# Patient Record
Sex: FEMALE | Race: OTHER | Employment: UNEMPLOYED | URBAN - METROPOLITAN AREA
[De-identification: names, ages, dates, MRNs, and addresses within clinical notes are randomized per-mention and may not be internally consistent; named-entity substitution may affect disease eponyms.]

---

## 2024-03-17 ENCOUNTER — HOSPITAL ENCOUNTER (EMERGENCY)
Facility: HOSPITAL | Age: 39
Discharge: HOME | End: 2024-03-17
Attending: STUDENT IN AN ORGANIZED HEALTH CARE EDUCATION/TRAINING PROGRAM

## 2024-03-17 VITALS
DIASTOLIC BLOOD PRESSURE: 72 MMHG | SYSTOLIC BLOOD PRESSURE: 105 MMHG | TEMPERATURE: 98.3 F | HEART RATE: 90 BPM | RESPIRATION RATE: 16 BRPM | WEIGHT: 170.19 LBS | OXYGEN SATURATION: 98 %

## 2024-03-17 DIAGNOSIS — H10.9 CONJUNCTIVITIS OF RIGHT EYE, UNSPECIFIED CONJUNCTIVITIS TYPE: Primary | ICD-10-CM

## 2024-03-17 DIAGNOSIS — J30.2 SEASONAL ALLERGIC RHINITIS, UNSPECIFIED TRIGGER: ICD-10-CM

## 2024-03-17 PROCEDURE — 6370000000 HC RX 637 (ALT 250 FOR IP): Performed by: EMERGENCY MEDICINE

## 2024-03-17 PROCEDURE — 99283 EMERGENCY DEPT VISIT LOW MDM: CPT

## 2024-03-17 RX ORDER — CETIRIZINE HYDROCHLORIDE 10 MG/1
10 TABLET ORAL
Status: COMPLETED | OUTPATIENT
Start: 2024-03-17 | End: 2024-03-17

## 2024-03-17 RX ORDER — CETIRIZINE HYDROCHLORIDE 10 MG/1
10 TABLET ORAL DAILY
Qty: 30 TABLET | Refills: 0 | Status: SHIPPED | OUTPATIENT
Start: 2024-03-17

## 2024-03-17 RX ORDER — GENTAMICIN SULFATE 3 MG/ML
2 SOLUTION/ DROPS OPHTHALMIC
Status: COMPLETED | OUTPATIENT
Start: 2024-03-17 | End: 2024-03-17

## 2024-03-17 RX ORDER — GENTAMICIN SULFATE 3 MG/ML
1 SOLUTION/ DROPS OPHTHALMIC EVERY 4 HOURS
Qty: 5 ML | Refills: 0 | Status: SHIPPED | OUTPATIENT
Start: 2024-03-17 | End: 2024-03-22

## 2024-03-17 RX ORDER — TETRACAINE HYDROCHLORIDE 5 MG/ML
1 SOLUTION OPHTHALMIC
Status: COMPLETED | OUTPATIENT
Start: 2024-03-17 | End: 2024-03-17

## 2024-03-17 RX ADMIN — FLUORESCEIN SODIUM 1 MG: 1 STRIP OPHTHALMIC at 12:36

## 2024-03-17 RX ADMIN — TETRACAINE HYDROCHLORIDE 1 DROP: 5 SOLUTION OPHTHALMIC at 12:36

## 2024-03-17 RX ADMIN — GENTAMICIN SULFATE 2 DROP: 3 SOLUTION OPHTHALMIC at 13:10

## 2024-03-17 RX ADMIN — CETIRIZINE HYDROCHLORIDE 10 MG: 10 TABLET, FILM COATED ORAL at 12:38

## 2024-03-17 ASSESSMENT — PAIN SCALES - GENERAL: PAINLEVEL_OUTOF10: 0

## 2024-03-17 NOTE — ED PROVIDER NOTES
HISTORY   No past medical history on file.      SURGICAL HISTORY     No past surgical history on file.      CURRENT MEDICATIONS       Previous Medications    No medications on file       ALLERGIES     Patient has no known allergies.    FAMILY HISTORY     No family history on file.       SOCIAL HISTORY              PHYSICAL EXAM    (up to 7 for level 4, 8 or more for level 5)     ED Triage Vitals [03/17/24 1227]   BP Temp Temp Source Pulse Respirations SpO2 Height Weight - Scale   105/72 98.3 °F (36.8 °C) Oral 90 16 98 % -- 77.2 kg (170 lb 3.1 oz)       There is no height or weight on file to calculate BMI.    Physical Exam  Vitals and nursing note reviewed.   Constitutional:       General: She is not in acute distress.     Appearance: Normal appearance. She is not ill-appearing, toxic-appearing or diaphoretic.      Comments:  female, non-smoker, presently unemployed   HENT:      Head: Normocephalic.      Right Ear: Tympanic membrane normal.      Left Ear: Tympanic membrane normal.      Nose: Nose normal.      Mouth/Throat:      Mouth: Mucous membranes are moist.      Pharynx: No posterior oropharyngeal erythema.   Eyes:      General:         Right eye: Discharge present.      Extraocular Movements: Extraocular movements intact.      Pupils: Pupils are equal, round, and reactive to light.      Comments: Visual acuity within normal limits. Mild right conjunctival injection; without conjunctival foreign body. Cornea clear; without fluorescein uptake; without foreign body. No foreign body noted with eyelid eversion.     Cardiovascular:      Rate and Rhythm: Normal rate and regular rhythm.   Pulmonary:      Effort: Pulmonary effort is normal.      Breath sounds: Normal breath sounds.   Musculoskeletal:      Cervical back: Normal range of motion and neck supple.   Lymphadenopathy:      Cervical: No cervical adenopathy.   Skin:     General: Skin is warm and dry.      Findings: No rash.   Neurological:      General:

## 2024-03-17 NOTE — ED TRIAGE NOTES
Triage: Pt arrives ambulatory from home with CC of right eye pain, itching and blurred vision. Endorses eye being encrusted when waking in the morning. Denies corrective eyewear or direct injury to the eye.

## 2024-08-06 ENCOUNTER — TELEPHONE (OUTPATIENT)
Age: 39
End: 2024-08-06

## 2024-08-06 NOTE — TELEPHONE ENCOUNTER
The  services used. Called pt about referral from [facility] for dx breast imaging. Called x 1 but no answer, left vm.LFAKO CAPELLAN RN

## 2024-08-13 NOTE — TELEPHONE ENCOUNTER
Called patient ID [id number]. Patient is interested in being enrolled in [facility] program. Completed eligibility and meets qualifications for program. Offered an appt. For a consult on 8/15/24 pt kindly declined and an appt. For 9/16/2024 at [facility] provided. Pt aware that our provider will confirm abnormal finding and if imaging is needed a new appt. Will be coordinated for the breast imaging. This has been fully explained to the patient, who indicates understanding and agrees with plan. No further questions at this time. Garima Khan RN

## 2024-09-12 ENCOUNTER — TELEPHONE (OUTPATIENT)
Age: 39
End: 2024-09-12

## 2024-09-16 ENCOUNTER — INITIAL CONSULT (OUTPATIENT)
Age: 39
End: 2024-09-16

## 2024-09-16 DIAGNOSIS — N63.0 BREAST MASS IN FEMALE: Primary | ICD-10-CM

## 2024-10-22 ENCOUNTER — HOSPITAL ENCOUNTER (OUTPATIENT)
Facility: HOSPITAL | Age: 39
Discharge: HOME | End: 2024-10-25

## 2024-10-22 VITALS — HEIGHT: 62 IN | WEIGHT: 170 LBS | BODY MASS INDEX: 31.28 KG/M2

## 2024-10-22 DIAGNOSIS — N63.0 BREAST MASS IN FEMALE: ICD-10-CM

## 2024-10-22 PROCEDURE — 76642 ULTRASOUND BREAST LIMITED: CPT

## 2024-10-22 PROCEDURE — G0279 TOMOSYNTHESIS, MAMMO: HCPCS

## 2024-11-05 ENCOUNTER — HOSPITAL ENCOUNTER (OUTPATIENT)
Facility: HOSPITAL | Age: 39
Discharge: HOME | End: 2024-11-08

## 2024-11-05 DIAGNOSIS — N63.0 BREAST MASS IN FEMALE: ICD-10-CM

## 2024-11-05 PROCEDURE — 88305 TISSUE EXAM BY PATHOLOGIST: CPT

## 2024-11-05 PROCEDURE — 19083 BX BREAST 1ST LESION US IMAG: CPT

## 2024-11-05 PROCEDURE — 2500000003 HC RX 250 WO HCPCS: Performed by: RADIOLOGY

## 2024-11-05 RX ORDER — LIDOCAINE HYDROCHLORIDE AND EPINEPHRINE 10; 10 MG/ML; UG/ML
10 INJECTION, SOLUTION INFILTRATION; PERINEURAL ONCE
Status: COMPLETED | OUTPATIENT
Start: 2024-11-05 | End: 2024-11-05

## 2024-11-05 RX ORDER — LIDOCAINE HYDROCHLORIDE 10 MG/ML
5 INJECTION, SOLUTION INFILTRATION; PERINEURAL ONCE
Status: COMPLETED | OUTPATIENT
Start: 2024-11-05 | End: 2024-11-05

## 2024-11-05 RX ADMIN — LIDOCAINE HYDROCHLORIDE,EPINEPHRINE BITARTRATE 10 ML: 10; .01 INJECTION, SOLUTION INFILTRATION; PERINEURAL at 09:50

## 2024-11-05 RX ADMIN — LIDOCAINE HYDROCHLORIDE 5 ML: 10 INJECTION, SOLUTION INFILTRATION; PERINEURAL at 09:50

## 2024-11-05 NOTE — PROGRESS NOTES
Consent obtained and discharge instructions reviewed with the assistance of the language line. Patient tolerated right breast biopsy well with scant bleeding. Biopsy site was bandaged in the usual fashion and discharge instructions were reviewed with the patient. She was provided with a written copy, in Korean, as well. Advised patient that results should be available in 2-3 business days and that she will receive a phone call. Encouraged her to call with any questions or concerns.

## 2024-11-07 ENCOUNTER — NURSE ONLY (OUTPATIENT)
Age: 39
End: 2024-11-07

## 2024-11-07 NOTE — PROGRESS NOTES
Pathology approved by Dr. Dixon called patient and relayed benign results. Advised patient that she should have annual mammograms beginning at age 41. She reports that her biopsy site is healing well with no concerns. Encouraged her to call with any question or concerns.

## 2024-11-07 NOTE — PROGRESS NOTES
Encounter completed in re: finalizing patient's referral in to the [facility] program.   Patient referred to us for  abnormal breast symptoms by [facility]. Pt enrolled in [facility] program and was sent to have diagnostic images on 9/16/2024- resulted Bi Rads 4 and had breast biopsy on 11/5/24 that was benign. . The provider was routed the imaging report and recommendation and pt was told of the results at the time of the appt. Patient has been billed in [employer].  DANDY BOO, RN